# Patient Record
Sex: FEMALE | Race: OTHER
[De-identification: names, ages, dates, MRNs, and addresses within clinical notes are randomized per-mention and may not be internally consistent; named-entity substitution may affect disease eponyms.]

---

## 2017-01-09 ENCOUNTER — ASOB RESULT (OUTPATIENT)
Age: 22
End: 2017-01-09

## 2017-01-09 ENCOUNTER — APPOINTMENT (OUTPATIENT)
Dept: ANTEPARTUM | Facility: CLINIC | Age: 22
End: 2017-01-09

## 2017-01-17 ENCOUNTER — OUTPATIENT (OUTPATIENT)
Dept: OUTPATIENT SERVICES | Facility: HOSPITAL | Age: 22
LOS: 1 days | End: 2017-01-17
Payer: MEDICAID

## 2017-01-17 ENCOUNTER — APPOINTMENT (OUTPATIENT)
Dept: OBGYN | Facility: CLINIC | Age: 22
End: 2017-01-17

## 2017-01-17 VITALS
SYSTOLIC BLOOD PRESSURE: 93 MMHG | HEIGHT: 62 IN | BODY MASS INDEX: 27.42 KG/M2 | WEIGHT: 149 LBS | DIASTOLIC BLOOD PRESSURE: 63 MMHG

## 2017-01-17 DIAGNOSIS — Z34.00 ENCOUNTER FOR SUPERVISION OF NORMAL FIRST PREGNANCY, UNSPECIFIED TRIMESTER: ICD-10-CM

## 2017-01-24 DIAGNOSIS — Z34.82 ENCOUNTER FOR SUPERVISION OF OTHER NORMAL PREGNANCY, SECOND TRIMESTER: ICD-10-CM

## 2017-01-24 DIAGNOSIS — O34.219 MATERNAL CARE FOR UNSPECIFIED TYPE SCAR FROM PREVIOUS CESAREAN DELIVERY: ICD-10-CM

## 2017-02-14 ENCOUNTER — APPOINTMENT (OUTPATIENT)
Dept: OBGYN | Facility: CLINIC | Age: 22
End: 2017-02-14

## 2017-02-14 ENCOUNTER — OUTPATIENT (OUTPATIENT)
Dept: OUTPATIENT SERVICES | Facility: HOSPITAL | Age: 22
LOS: 1 days | End: 2017-02-14
Payer: MEDICAID

## 2017-02-14 VITALS
BODY MASS INDEX: 28.34 KG/M2 | HEIGHT: 62 IN | SYSTOLIC BLOOD PRESSURE: 100 MMHG | WEIGHT: 154 LBS | DIASTOLIC BLOOD PRESSURE: 59 MMHG

## 2017-02-14 DIAGNOSIS — Z34.00 ENCOUNTER FOR SUPERVISION OF NORMAL FIRST PREGNANCY, UNSPECIFIED TRIMESTER: ICD-10-CM

## 2017-02-14 LAB
BASOPHILS # BLD AUTO: 0.03 K/UL — SIGNIFICANT CHANGE UP (ref 0–0.2)
BASOPHILS NFR BLD AUTO: 0.3 % — SIGNIFICANT CHANGE UP (ref 0–2)
EOSINOPHIL # BLD AUTO: 0.63 K/UL — HIGH (ref 0–0.5)
EOSINOPHIL NFR BLD AUTO: 7 % — HIGH (ref 0–6)
HCT VFR BLD CALC: 37.4 % — SIGNIFICANT CHANGE UP (ref 34.5–45)
HGB BLD-MCNC: 12.5 G/DL — SIGNIFICANT CHANGE UP (ref 11.5–15.5)
IMM GRANULOCYTES NFR BLD AUTO: 1 % — SIGNIFICANT CHANGE UP (ref 0–1.5)
LYMPHOCYTES # BLD AUTO: 2.14 K/UL — SIGNIFICANT CHANGE UP (ref 1–3.3)
LYMPHOCYTES # BLD AUTO: 23.9 % — SIGNIFICANT CHANGE UP (ref 13–44)
MCHC RBC-ENTMCNC: 30.8 PG — SIGNIFICANT CHANGE UP (ref 27–34)
MCHC RBC-ENTMCNC: 33.4 GM/DL — SIGNIFICANT CHANGE UP (ref 32–36)
MCV RBC AUTO: 92.1 FL — SIGNIFICANT CHANGE UP (ref 80–100)
MONOCYTES # BLD AUTO: 0.48 K/UL — SIGNIFICANT CHANGE UP (ref 0–0.9)
MONOCYTES NFR BLD AUTO: 5.4 % — SIGNIFICANT CHANGE UP (ref 2–14)
NEUTROPHILS # BLD AUTO: 5.57 K/UL — SIGNIFICANT CHANGE UP (ref 1.8–7.4)
NEUTROPHILS NFR BLD AUTO: 62.4 % — SIGNIFICANT CHANGE UP (ref 43–77)
PLATELET # BLD AUTO: 243 K/UL — SIGNIFICANT CHANGE UP (ref 150–400)
RBC # BLD: 4.06 M/UL — SIGNIFICANT CHANGE UP (ref 3.8–5.2)
RBC # FLD: 15.3 % — HIGH (ref 10.3–14.5)
WBC # BLD: 8.94 K/UL — SIGNIFICANT CHANGE UP (ref 3.8–10.5)
WBC # FLD AUTO: 8.94 K/UL — SIGNIFICANT CHANGE UP (ref 3.8–10.5)

## 2017-02-15 LAB — GLUCOSE 1H P GLC SERPL-MCNC: 111 MG/DL — SIGNIFICANT CHANGE UP (ref 70–134)

## 2017-02-16 DIAGNOSIS — Z34.82 ENCOUNTER FOR SUPERVISION OF OTHER NORMAL PREGNANCY, SECOND TRIMESTER: ICD-10-CM

## 2017-02-16 DIAGNOSIS — O34.219 MATERNAL CARE FOR UNSPECIFIED TYPE SCAR FROM PREVIOUS CESAREAN DELIVERY: ICD-10-CM

## 2017-02-16 DIAGNOSIS — Z3A.25 25 WEEKS GESTATION OF PREGNANCY: ICD-10-CM

## 2017-02-28 ENCOUNTER — OUTPATIENT (OUTPATIENT)
Dept: OUTPATIENT SERVICES | Facility: HOSPITAL | Age: 22
LOS: 1 days | End: 2017-02-28
Payer: MEDICAID

## 2017-02-28 ENCOUNTER — APPOINTMENT (OUTPATIENT)
Dept: OBGYN | Facility: CLINIC | Age: 22
End: 2017-02-28

## 2017-02-28 VITALS
WEIGHT: 156 LBS | SYSTOLIC BLOOD PRESSURE: 102 MMHG | HEIGHT: 62 IN | DIASTOLIC BLOOD PRESSURE: 64 MMHG | BODY MASS INDEX: 28.71 KG/M2

## 2017-02-28 DIAGNOSIS — Z87.898 PERSONAL HISTORY OF OTHER SPECIFIED CONDITIONS: ICD-10-CM

## 2017-02-28 DIAGNOSIS — Z00.00 ENCOUNTER FOR GENERAL ADULT MEDICAL EXAMINATION WITHOUT ABNORMAL FINDINGS: ICD-10-CM

## 2017-02-28 DIAGNOSIS — Z3A.25 25 WEEKS GESTATION OF PREGNANCY: ICD-10-CM

## 2017-03-01 DIAGNOSIS — Z23 ENCOUNTER FOR IMMUNIZATION: ICD-10-CM

## 2017-03-01 DIAGNOSIS — Z34.82 ENCOUNTER FOR SUPERVISION OF OTHER NORMAL PREGNANCY, SECOND TRIMESTER: ICD-10-CM

## 2017-03-01 DIAGNOSIS — O34.219 MATERNAL CARE FOR UNSPECIFIED TYPE SCAR FROM PREVIOUS CESAREAN DELIVERY: ICD-10-CM

## 2017-03-21 ENCOUNTER — APPOINTMENT (OUTPATIENT)
Dept: OBGYN | Facility: CLINIC | Age: 22
End: 2017-03-21

## 2017-03-21 ENCOUNTER — OUTPATIENT (OUTPATIENT)
Dept: OUTPATIENT SERVICES | Facility: HOSPITAL | Age: 22
LOS: 1 days | End: 2017-03-21
Payer: MEDICAID

## 2017-03-21 DIAGNOSIS — Z34.00 ENCOUNTER FOR SUPERVISION OF NORMAL FIRST PREGNANCY, UNSPECIFIED TRIMESTER: ICD-10-CM

## 2017-03-21 RX ORDER — MULTIVIT-MIN/FOLIC/VIT K/LYCOP 400-300MCG
28-0.8 TABLET ORAL DAILY
Qty: 30 | Refills: 6 | Status: DISCONTINUED | COMMUNITY
Start: 2017-02-28 | End: 2017-03-21

## 2017-03-22 DIAGNOSIS — Z3A.20 20 WEEKS GESTATION OF PREGNANCY: ICD-10-CM

## 2017-03-22 DIAGNOSIS — O34.219 MATERNAL CARE FOR UNSPECIFIED TYPE SCAR FROM PREVIOUS CESAREAN DELIVERY: ICD-10-CM

## 2017-03-27 ENCOUNTER — MEDICATION RENEWAL (OUTPATIENT)
Age: 22
End: 2017-03-27

## 2017-03-27 RX ORDER — ELASTIC BANDAGE 2"X2.2YD
0.8 BANDAGE TOPICAL DAILY
Qty: 30 | Refills: 5 | Status: DISCONTINUED | COMMUNITY
Start: 2017-02-14 | End: 2017-03-27

## 2017-04-04 ENCOUNTER — APPOINTMENT (OUTPATIENT)
Dept: OBGYN | Facility: CLINIC | Age: 22
End: 2017-04-04

## 2017-04-04 ENCOUNTER — OUTPATIENT (OUTPATIENT)
Dept: OUTPATIENT SERVICES | Facility: HOSPITAL | Age: 22
LOS: 1 days | End: 2017-04-04
Payer: MEDICAID

## 2017-04-04 VITALS
SYSTOLIC BLOOD PRESSURE: 97 MMHG | WEIGHT: 160 LBS | DIASTOLIC BLOOD PRESSURE: 63 MMHG | BODY MASS INDEX: 29.44 KG/M2 | HEIGHT: 62 IN

## 2017-04-04 DIAGNOSIS — Z23 ENCOUNTER FOR IMMUNIZATION: ICD-10-CM

## 2017-04-04 DIAGNOSIS — Z34.82 ENCOUNTER FOR SUPERVISION OF OTHER NORMAL PREGNANCY, SECOND TRIMESTER: ICD-10-CM

## 2017-04-04 DIAGNOSIS — Z3A.30 30 WEEKS GESTATION OF PREGNANCY: ICD-10-CM

## 2017-04-04 DIAGNOSIS — Z34.00 ENCOUNTER FOR SUPERVISION OF NORMAL FIRST PREGNANCY, UNSPECIFIED TRIMESTER: ICD-10-CM

## 2017-04-10 DIAGNOSIS — Z34.83 ENCOUNTER FOR SUPERVISION OF OTHER NORMAL PREGNANCY, THIRD TRIMESTER: ICD-10-CM

## 2017-04-10 DIAGNOSIS — O34.219 MATERNAL CARE FOR UNSPECIFIED TYPE SCAR FROM PREVIOUS CESAREAN DELIVERY: ICD-10-CM

## 2017-04-19 ENCOUNTER — APPOINTMENT (OUTPATIENT)
Dept: OBGYN | Facility: CLINIC | Age: 22
End: 2017-04-19

## 2017-04-19 ENCOUNTER — OUTPATIENT (OUTPATIENT)
Dept: OUTPATIENT SERVICES | Facility: HOSPITAL | Age: 22
LOS: 1 days | End: 2017-04-19
Payer: MEDICAID

## 2017-04-19 VITALS
WEIGHT: 163 LBS | SYSTOLIC BLOOD PRESSURE: 103 MMHG | BODY MASS INDEX: 30 KG/M2 | HEIGHT: 62 IN | DIASTOLIC BLOOD PRESSURE: 60 MMHG

## 2017-04-19 DIAGNOSIS — Z34.00 ENCOUNTER FOR SUPERVISION OF NORMAL FIRST PREGNANCY, UNSPECIFIED TRIMESTER: ICD-10-CM

## 2017-04-19 LAB
BASOPHILS # BLD AUTO: 0.04 K/UL — SIGNIFICANT CHANGE UP (ref 0–0.2)
BASOPHILS NFR BLD AUTO: 0.4 % — SIGNIFICANT CHANGE UP (ref 0–2)
EOSINOPHIL # BLD AUTO: 0.5 K/UL — SIGNIFICANT CHANGE UP (ref 0–0.5)
EOSINOPHIL NFR BLD AUTO: 5.3 % — SIGNIFICANT CHANGE UP (ref 0–6)
HCT VFR BLD CALC: 38.8 % — SIGNIFICANT CHANGE UP (ref 34.5–45)
HGB BLD-MCNC: 13.6 G/DL — SIGNIFICANT CHANGE UP (ref 11.5–15.5)
HIV 1+2 AB+HIV1 P24 AG SERPL QL IA: SIGNIFICANT CHANGE UP
IMM GRANULOCYTES NFR BLD AUTO: 1.6 % — HIGH (ref 0–1.5)
LYMPHOCYTES # BLD AUTO: 2.24 K/UL — SIGNIFICANT CHANGE UP (ref 1–3.3)
LYMPHOCYTES # BLD AUTO: 23.6 % — SIGNIFICANT CHANGE UP (ref 13–44)
MCHC RBC-ENTMCNC: 32.5 PG — SIGNIFICANT CHANGE UP (ref 27–34)
MCHC RBC-ENTMCNC: 35.1 GM/DL — SIGNIFICANT CHANGE UP (ref 32–36)
MCV RBC AUTO: 92.6 FL — SIGNIFICANT CHANGE UP (ref 80–100)
MONOCYTES # BLD AUTO: 0.6 K/UL — SIGNIFICANT CHANGE UP (ref 0–0.9)
MONOCYTES NFR BLD AUTO: 6.3 % — SIGNIFICANT CHANGE UP (ref 2–14)
NEUTROPHILS # BLD AUTO: 5.98 K/UL — SIGNIFICANT CHANGE UP (ref 1.8–7.4)
NEUTROPHILS NFR BLD AUTO: 62.8 % — SIGNIFICANT CHANGE UP (ref 43–77)
PLATELET # BLD AUTO: 219 K/UL — SIGNIFICANT CHANGE UP (ref 150–400)
RBC # BLD: 4.19 M/UL — SIGNIFICANT CHANGE UP (ref 3.8–5.2)
RBC # FLD: 13.2 % — SIGNIFICANT CHANGE UP (ref 10.3–14.5)
T PALLIDUM AB TITR SER: NEGATIVE — SIGNIFICANT CHANGE UP
WBC # BLD: 9.51 K/UL — SIGNIFICANT CHANGE UP (ref 3.8–10.5)
WBC # FLD AUTO: 9.51 K/UL — SIGNIFICANT CHANGE UP (ref 3.8–10.5)

## 2017-04-20 ENCOUNTER — APPOINTMENT (OUTPATIENT)
Dept: ANTEPARTUM | Facility: CLINIC | Age: 22
End: 2017-04-20

## 2017-04-20 ENCOUNTER — ASOB RESULT (OUTPATIENT)
Age: 22
End: 2017-04-20

## 2017-04-20 LAB
C TRACH RRNA SPEC QL NAA+PROBE: SIGNIFICANT CHANGE UP
GROUP B BETA STREP DNA (PCR): SIGNIFICANT CHANGE UP
GROUP B BETA STREP INTERPRETATION: SIGNIFICANT CHANGE UP
N GONORRHOEA RRNA SPEC QL NAA+PROBE: SIGNIFICANT CHANGE UP
SOURCE GROUP B STREP: SIGNIFICANT CHANGE UP
SPECIMEN SOURCE: SIGNIFICANT CHANGE UP

## 2017-04-21 DIAGNOSIS — Z34.83 ENCOUNTER FOR SUPERVISION OF OTHER NORMAL PREGNANCY, THIRD TRIMESTER: ICD-10-CM

## 2017-04-21 DIAGNOSIS — O34.219 MATERNAL CARE FOR UNSPECIFIED TYPE SCAR FROM PREVIOUS CESAREAN DELIVERY: ICD-10-CM

## 2017-04-27 ENCOUNTER — MEDICATION RENEWAL (OUTPATIENT)
Age: 22
End: 2017-04-27

## 2017-04-27 RX ORDER — ELASTIC BANDAGE 2"X2.2YD
0.8 BANDAGE TOPICAL DAILY
Qty: 30 | Refills: 5 | Status: DISCONTINUED | COMMUNITY
Start: 2017-03-27 | End: 2017-04-27

## 2017-04-27 RX ORDER — ELASTIC BANDAGE 2"X2.2YD
0.8 BANDAGE TOPICAL DAILY
Qty: 30 | Refills: 8 | Status: ACTIVE | COMMUNITY
Start: 2017-04-27 | End: 1900-01-01

## 2017-05-03 ENCOUNTER — OUTPATIENT (OUTPATIENT)
Dept: OUTPATIENT SERVICES | Facility: HOSPITAL | Age: 22
LOS: 1 days | End: 2017-05-03
Payer: MEDICAID

## 2017-05-03 ENCOUNTER — APPOINTMENT (OUTPATIENT)
Dept: OBGYN | Facility: CLINIC | Age: 22
End: 2017-05-03

## 2017-05-03 VITALS
WEIGHT: 167 LBS | HEIGHT: 62 IN | DIASTOLIC BLOOD PRESSURE: 67 MMHG | BODY MASS INDEX: 30.73 KG/M2 | SYSTOLIC BLOOD PRESSURE: 111 MMHG

## 2017-05-03 DIAGNOSIS — Z34.00 ENCOUNTER FOR SUPERVISION OF NORMAL FIRST PREGNANCY, UNSPECIFIED TRIMESTER: ICD-10-CM

## 2017-05-04 DIAGNOSIS — O34.219 MATERNAL CARE FOR UNSPECIFIED TYPE SCAR FROM PREVIOUS CESAREAN DELIVERY: ICD-10-CM

## 2017-05-04 DIAGNOSIS — Z34.83 ENCOUNTER FOR SUPERVISION OF OTHER NORMAL PREGNANCY, THIRD TRIMESTER: ICD-10-CM

## 2017-05-10 ENCOUNTER — APPOINTMENT (OUTPATIENT)
Dept: OBGYN | Facility: CLINIC | Age: 22
End: 2017-05-10

## 2017-05-10 ENCOUNTER — OUTPATIENT (OUTPATIENT)
Dept: OUTPATIENT SERVICES | Facility: HOSPITAL | Age: 22
LOS: 1 days | End: 2017-05-10
Payer: MEDICAID

## 2017-05-10 VITALS
WEIGHT: 168 LBS | DIASTOLIC BLOOD PRESSURE: 80 MMHG | HEIGHT: 62 IN | SYSTOLIC BLOOD PRESSURE: 122 MMHG | BODY MASS INDEX: 30.91 KG/M2

## 2017-05-10 DIAGNOSIS — Z34.83 ENCOUNTER FOR SUPERVISION OF OTHER NORMAL PREGNANCY, THIRD TRIMESTER: ICD-10-CM

## 2017-05-10 DIAGNOSIS — O34.219 MATERNAL CARE FOR UNSPECIFIED TYPE SCAR FROM PREVIOUS CESAREAN DELIVERY: ICD-10-CM

## 2017-05-10 DIAGNOSIS — Z34.00 ENCOUNTER FOR SUPERVISION OF NORMAL FIRST PREGNANCY, UNSPECIFIED TRIMESTER: ICD-10-CM

## 2017-05-11 DIAGNOSIS — O34.219 MATERNAL CARE FOR UNSPECIFIED TYPE SCAR FROM PREVIOUS CESAREAN DELIVERY: ICD-10-CM

## 2017-05-11 DIAGNOSIS — Z34.83 ENCOUNTER FOR SUPERVISION OF OTHER NORMAL PREGNANCY, THIRD TRIMESTER: ICD-10-CM

## 2017-05-17 ENCOUNTER — OUTPATIENT (OUTPATIENT)
Dept: OUTPATIENT SERVICES | Facility: HOSPITAL | Age: 22
LOS: 1 days | End: 2017-05-17
Payer: MEDICAID

## 2017-05-17 DIAGNOSIS — Z3A.39 39 WEEKS GESTATION OF PREGNANCY: ICD-10-CM

## 2017-05-17 DIAGNOSIS — Z01.818 ENCOUNTER FOR OTHER PREPROCEDURAL EXAMINATION: ICD-10-CM

## 2017-05-17 LAB
ANION GAP SERPL CALC-SCNC: 9 MMOL/L — SIGNIFICANT CHANGE UP (ref 5–17)
APTT BLD: 25.5 SEC — LOW (ref 27.5–37.4)
BUN SERPL-MCNC: 5 MG/DL — LOW (ref 7–18)
CALCIUM SERPL-MCNC: 8.9 MG/DL — SIGNIFICANT CHANGE UP (ref 8.4–10.5)
CHLORIDE SERPL-SCNC: 107 MMOL/L — SIGNIFICANT CHANGE UP (ref 96–108)
CO2 SERPL-SCNC: 23 MMOL/L — SIGNIFICANT CHANGE UP (ref 22–31)
CREAT SERPL-MCNC: 0.46 MG/DL — LOW (ref 0.5–1.3)
GLUCOSE SERPL-MCNC: 93 MG/DL — SIGNIFICANT CHANGE UP (ref 70–99)
HCT VFR BLD CALC: 43.1 % — SIGNIFICANT CHANGE UP (ref 34.5–45)
HGB BLD-MCNC: 14.4 G/DL — SIGNIFICANT CHANGE UP (ref 11.5–15.5)
INR BLD: 0.94 RATIO — SIGNIFICANT CHANGE UP (ref 0.88–1.16)
MCHC RBC-ENTMCNC: 31.9 PG — SIGNIFICANT CHANGE UP (ref 27–34)
MCHC RBC-ENTMCNC: 33.5 GM/DL — SIGNIFICANT CHANGE UP (ref 32–36)
MCV RBC AUTO: 95.3 FL — SIGNIFICANT CHANGE UP (ref 80–100)
PLATELET # BLD AUTO: 258 K/UL — SIGNIFICANT CHANGE UP (ref 150–400)
POTASSIUM SERPL-MCNC: 3.8 MMOL/L — SIGNIFICANT CHANGE UP (ref 3.5–5.3)
POTASSIUM SERPL-SCNC: 3.8 MMOL/L — SIGNIFICANT CHANGE UP (ref 3.5–5.3)
PROTHROM AB SERPL-ACNC: 10.2 SEC — SIGNIFICANT CHANGE UP (ref 9.8–12.7)
RBC # BLD: 4.52 M/UL — SIGNIFICANT CHANGE UP (ref 3.8–5.2)
RBC # FLD: 12 % — SIGNIFICANT CHANGE UP (ref 10.3–14.5)
SODIUM SERPL-SCNC: 139 MMOL/L — SIGNIFICANT CHANGE UP (ref 135–145)
T PALLIDUM AB TITR SER: NEGATIVE — SIGNIFICANT CHANGE UP
WBC # BLD: 10.3 K/UL — SIGNIFICANT CHANGE UP (ref 3.8–10.5)
WBC # FLD AUTO: 10.3 K/UL — SIGNIFICANT CHANGE UP (ref 3.8–10.5)

## 2017-05-18 ENCOUNTER — TRANSCRIPTION ENCOUNTER (OUTPATIENT)
Age: 22
End: 2017-05-18

## 2017-05-18 ENCOUNTER — RESULT REVIEW (OUTPATIENT)
Age: 22
End: 2017-05-18

## 2017-05-18 ENCOUNTER — INPATIENT (INPATIENT)
Facility: HOSPITAL | Age: 22
LOS: 2 days | Discharge: HOME | End: 2017-05-21
Attending: OBSTETRICS & GYNECOLOGY | Admitting: OBSTETRICS & GYNECOLOGY
Payer: MEDICAID

## 2017-05-18 VITALS — HEIGHT: 62 IN | WEIGHT: 169.76 LBS

## 2017-05-18 DIAGNOSIS — Z3A.39 39 WEEKS GESTATION OF PREGNANCY: ICD-10-CM

## 2017-05-18 LAB
BASOPHILS # BLD AUTO: 0.1 K/UL — SIGNIFICANT CHANGE UP (ref 0–0.2)
BASOPHILS NFR BLD AUTO: 0.8 % — SIGNIFICANT CHANGE UP (ref 0–2)
EOSINOPHIL # BLD AUTO: 0.2 K/UL — SIGNIFICANT CHANGE UP (ref 0–0.5)
EOSINOPHIL NFR BLD AUTO: 1.2 % — SIGNIFICANT CHANGE UP (ref 0–6)
HCT VFR BLD CALC: 31.1 % — LOW (ref 34.5–45)
HCT VFR BLD CALC: 38 % — SIGNIFICANT CHANGE UP (ref 34.5–45)
HGB BLD-MCNC: 11.2 G/DL — LOW (ref 11.5–15.5)
HGB BLD-MCNC: 12.6 G/DL — SIGNIFICANT CHANGE UP (ref 11.5–15.5)
LYMPHOCYTES # BLD AUTO: 15.1 % — SIGNIFICANT CHANGE UP (ref 13–44)
LYMPHOCYTES # BLD AUTO: 2.6 K/UL — SIGNIFICANT CHANGE UP (ref 1–3.3)
MCHC RBC-ENTMCNC: 31.4 PG — SIGNIFICANT CHANGE UP (ref 27–34)
MCHC RBC-ENTMCNC: 33.3 GM/DL — SIGNIFICANT CHANGE UP (ref 32–36)
MCHC RBC-ENTMCNC: 33.6 PG — SIGNIFICANT CHANGE UP (ref 27–34)
MCHC RBC-ENTMCNC: 35.9 GM/DL — SIGNIFICANT CHANGE UP (ref 32–36)
MCV RBC AUTO: 93.4 FL — SIGNIFICANT CHANGE UP (ref 80–100)
MCV RBC AUTO: 94.5 FL — SIGNIFICANT CHANGE UP (ref 80–100)
MONOCYTES # BLD AUTO: 0.7 K/UL — SIGNIFICANT CHANGE UP (ref 0–0.9)
MONOCYTES NFR BLD AUTO: 4.3 % — SIGNIFICANT CHANGE UP (ref 2–14)
NEUTROPHILS # BLD AUTO: 13.6 K/UL — HIGH (ref 1.8–7.4)
NEUTROPHILS NFR BLD AUTO: 78.6 % — HIGH (ref 43–77)
PLATELET # BLD AUTO: 218 K/UL — SIGNIFICANT CHANGE UP (ref 150–400)
PLATELET # BLD AUTO: 253 K/UL — SIGNIFICANT CHANGE UP (ref 150–400)
RBC # BLD: 3.33 M/UL — LOW (ref 3.8–5.2)
RBC # BLD: 4.02 M/UL — SIGNIFICANT CHANGE UP (ref 3.8–5.2)
RBC # FLD: 11.7 % — SIGNIFICANT CHANGE UP (ref 10.3–14.5)
RBC # FLD: 12.4 % — SIGNIFICANT CHANGE UP (ref 10.3–14.5)
WBC # BLD: 13.3 K/UL — HIGH (ref 3.8–10.5)
WBC # BLD: 17.4 K/UL — HIGH (ref 3.8–10.5)
WBC # FLD AUTO: 13.3 K/UL — HIGH (ref 3.8–10.5)
WBC # FLD AUTO: 17.4 K/UL — HIGH (ref 3.8–10.5)

## 2017-05-18 PROCEDURE — 59514 CESAREAN DELIVERY ONLY: CPT | Mod: U9

## 2017-05-18 PROCEDURE — 85730 THROMBOPLASTIN TIME PARTIAL: CPT

## 2017-05-18 PROCEDURE — 86920 COMPATIBILITY TEST SPIN: CPT

## 2017-05-18 PROCEDURE — 86900 BLOOD TYPING SEROLOGIC ABO: CPT

## 2017-05-18 PROCEDURE — 86780 TREPONEMA PALLIDUM: CPT

## 2017-05-18 PROCEDURE — 86901 BLOOD TYPING SEROLOGIC RH(D): CPT

## 2017-05-18 PROCEDURE — 85610 PROTHROMBIN TIME: CPT

## 2017-05-18 PROCEDURE — 85027 COMPLETE CBC AUTOMATED: CPT

## 2017-05-18 PROCEDURE — 88302 TISSUE EXAM BY PATHOLOGIST: CPT | Mod: 26

## 2017-05-18 PROCEDURE — 80048 BASIC METABOLIC PNL TOTAL CA: CPT

## 2017-05-18 PROCEDURE — 86850 RBC ANTIBODY SCREEN: CPT

## 2017-05-18 RX ORDER — SODIUM CHLORIDE 9 MG/ML
1000 INJECTION, SOLUTION INTRAVENOUS ONCE
Qty: 0 | Refills: 0 | Status: COMPLETED | OUTPATIENT
Start: 2017-05-18 | End: 2017-05-18

## 2017-05-18 RX ORDER — SODIUM CHLORIDE 9 MG/ML
1000 INJECTION, SOLUTION INTRAVENOUS
Qty: 0 | Refills: 0 | Status: DISCONTINUED | OUTPATIENT
Start: 2017-05-18 | End: 2017-05-20

## 2017-05-18 RX ORDER — SIMETHICONE 80 MG/1
80 TABLET, CHEWABLE ORAL EVERY 4 HOURS
Qty: 0 | Refills: 0 | Status: DISCONTINUED | OUTPATIENT
Start: 2017-05-18 | End: 2017-05-21

## 2017-05-18 RX ORDER — MORPHINE SULFATE 50 MG/1
4 CAPSULE, EXTENDED RELEASE ORAL EVERY 4 HOURS
Qty: 0 | Refills: 0 | Status: DISCONTINUED | OUTPATIENT
Start: 2017-05-18 | End: 2017-05-19

## 2017-05-18 RX ORDER — KETOROLAC TROMETHAMINE 30 MG/ML
30 SYRINGE (ML) INJECTION EVERY 6 HOURS
Qty: 0 | Refills: 0 | Status: DISCONTINUED | OUTPATIENT
Start: 2017-05-18 | End: 2017-05-19

## 2017-05-18 RX ORDER — FERROUS SULFATE 325(65) MG
325 TABLET ORAL DAILY
Qty: 0 | Refills: 0 | Status: DISCONTINUED | OUTPATIENT
Start: 2017-05-18 | End: 2017-05-21

## 2017-05-18 RX ORDER — TETANUS TOXOID, REDUCED DIPHTHERIA TOXOID AND ACELLULAR PERTUSSIS VACCINE, ADSORBED 5; 2.5; 8; 8; 2.5 [IU]/.5ML; [IU]/.5ML; UG/.5ML; UG/.5ML; UG/.5ML
0.5 SUSPENSION INTRAMUSCULAR ONCE
Qty: 0 | Refills: 0 | Status: DISCONTINUED | OUTPATIENT
Start: 2017-05-18 | End: 2017-05-21

## 2017-05-18 RX ORDER — CITRIC ACID/SODIUM CITRATE 300-500 MG
30 SOLUTION, ORAL ORAL ONCE
Qty: 0 | Refills: 0 | Status: DISCONTINUED | OUTPATIENT
Start: 2017-05-18 | End: 2017-05-21

## 2017-05-18 RX ORDER — ACETAMINOPHEN 500 MG
650 TABLET ORAL EVERY 6 HOURS
Qty: 0 | Refills: 0 | Status: DISCONTINUED | OUTPATIENT
Start: 2017-05-18 | End: 2017-05-21

## 2017-05-18 RX ORDER — DIPHENHYDRAMINE HCL 50 MG
25 CAPSULE ORAL EVERY 6 HOURS
Qty: 0 | Refills: 0 | Status: DISCONTINUED | OUTPATIENT
Start: 2017-05-18 | End: 2017-05-21

## 2017-05-18 RX ORDER — GLYCERIN ADULT
1 SUPPOSITORY, RECTAL RECTAL AT BEDTIME
Qty: 0 | Refills: 0 | Status: DISCONTINUED | OUTPATIENT
Start: 2017-05-18 | End: 2017-05-21

## 2017-05-18 RX ORDER — CEFAZOLIN SODIUM 1 G
2000 VIAL (EA) INJECTION ONCE
Qty: 0 | Refills: 0 | Status: COMPLETED | OUTPATIENT
Start: 2017-05-18 | End: 2017-05-18

## 2017-05-18 RX ORDER — DOCUSATE SODIUM 100 MG
100 CAPSULE ORAL
Qty: 0 | Refills: 0 | Status: DISCONTINUED | OUTPATIENT
Start: 2017-05-18 | End: 2017-05-20

## 2017-05-18 RX ORDER — ENOXAPARIN SODIUM 100 MG/ML
40 INJECTION SUBCUTANEOUS DAILY
Qty: 0 | Refills: 0 | Status: DISCONTINUED | OUTPATIENT
Start: 2017-05-18 | End: 2017-05-21

## 2017-05-18 RX ORDER — LANOLIN
1 OINTMENT (GRAM) TOPICAL
Qty: 0 | Refills: 0 | Status: DISCONTINUED | OUTPATIENT
Start: 2017-05-18 | End: 2017-05-21

## 2017-05-18 RX ORDER — OXYTOCIN 10 UNIT/ML
41.67 VIAL (ML) INJECTION
Qty: 20 | Refills: 0 | Status: DISCONTINUED | OUTPATIENT
Start: 2017-05-18 | End: 2017-05-21

## 2017-05-18 RX ORDER — METOCLOPRAMIDE HCL 10 MG
10 TABLET ORAL ONCE
Qty: 0 | Refills: 0 | Status: DISCONTINUED | OUTPATIENT
Start: 2017-05-18 | End: 2017-05-21

## 2017-05-18 RX ADMIN — Medication 125 MILLIUNIT(S)/MIN: at 09:09

## 2017-05-18 RX ADMIN — SODIUM CHLORIDE 2000 MILLILITER(S): 9 INJECTION, SOLUTION INTRAVENOUS at 06:40

## 2017-05-18 RX ADMIN — MORPHINE SULFATE 4 MILLIGRAM(S): 50 CAPSULE, EXTENDED RELEASE ORAL at 14:23

## 2017-05-18 RX ADMIN — Medication 30 MILLIGRAM(S): at 11:11

## 2017-05-18 RX ADMIN — Medication 100 MILLIGRAM(S): at 07:44

## 2017-05-18 RX ADMIN — Medication 30 MILLIGRAM(S): at 18:24

## 2017-05-18 RX ADMIN — MORPHINE SULFATE 4 MILLIGRAM(S): 50 CAPSULE, EXTENDED RELEASE ORAL at 13:53

## 2017-05-18 RX ADMIN — Medication 30 MILLIGRAM(S): at 11:41

## 2017-05-18 RX ADMIN — SODIUM CHLORIDE 125 MILLILITER(S): 9 INJECTION, SOLUTION INTRAVENOUS at 07:15

## 2017-05-18 RX ADMIN — Medication 30 MILLIGRAM(S): at 17:48

## 2017-05-18 RX ADMIN — Medication 30 MILLIGRAM(S): at 23:23

## 2017-05-19 ENCOUNTER — TRANSCRIPTION ENCOUNTER (OUTPATIENT)
Age: 22
End: 2017-05-19

## 2017-05-19 LAB
HCT VFR BLD CALC: 30.2 % — LOW (ref 34.5–45)
HGB BLD-MCNC: 10.3 G/DL — LOW (ref 11.5–15.5)
MCHC RBC-ENTMCNC: 32.4 PG — SIGNIFICANT CHANGE UP (ref 27–34)
MCHC RBC-ENTMCNC: 34 GM/DL — SIGNIFICANT CHANGE UP (ref 32–36)
MCV RBC AUTO: 95.2 FL — SIGNIFICANT CHANGE UP (ref 80–100)
PLATELET # BLD AUTO: 237 K/UL — SIGNIFICANT CHANGE UP (ref 150–400)
RBC # BLD: 3.18 M/UL — LOW (ref 3.8–5.2)
RBC # FLD: 11.9 % — SIGNIFICANT CHANGE UP (ref 10.3–14.5)
WBC # BLD: 14.9 K/UL — HIGH (ref 3.8–10.5)
WBC # FLD AUTO: 14.9 K/UL — HIGH (ref 3.8–10.5)

## 2017-05-19 RX ORDER — IBUPROFEN 200 MG
600 TABLET ORAL EVERY 6 HOURS
Qty: 0 | Refills: 0 | Status: DISCONTINUED | OUTPATIENT
Start: 2017-05-19 | End: 2017-05-21

## 2017-05-19 RX ADMIN — Medication 600 MILLIGRAM(S): at 13:45

## 2017-05-19 RX ADMIN — Medication 30 MILLIGRAM(S): at 05:49

## 2017-05-19 RX ADMIN — Medication 600 MILLIGRAM(S): at 13:09

## 2017-05-19 RX ADMIN — ENOXAPARIN SODIUM 40 MILLIGRAM(S): 100 INJECTION SUBCUTANEOUS at 01:49

## 2017-05-19 RX ADMIN — SIMETHICONE 80 MILLIGRAM(S): 80 TABLET, CHEWABLE ORAL at 18:24

## 2017-05-19 RX ADMIN — Medication 600 MILLIGRAM(S): at 22:09

## 2017-05-19 RX ADMIN — Medication 100 MILLIGRAM(S): at 13:09

## 2017-05-19 RX ADMIN — SIMETHICONE 80 MILLIGRAM(S): 80 TABLET, CHEWABLE ORAL at 13:09

## 2017-05-19 RX ADMIN — Medication 325 MILLIGRAM(S): at 13:09

## 2017-05-19 RX ADMIN — Medication 1 TABLET(S): at 13:09

## 2017-05-19 RX ADMIN — Medication 600 MILLIGRAM(S): at 21:39

## 2017-05-19 RX ADMIN — ENOXAPARIN SODIUM 40 MILLIGRAM(S): 100 INJECTION SUBCUTANEOUS at 13:09

## 2017-05-19 RX ADMIN — Medication 30 MILLIGRAM(S): at 06:30

## 2017-05-19 NOTE — DISCHARGE NOTE OB - CARE PLAN
Principal Discharge DX:	 delivery delivered  Goal:	pain management and breastfeeding  Instructions for follow-up, activity and diet:	Continue breastfeeding.  Motrin as needed for pain.  Ambulate daily.  No heavy lifting or anything per vagina x 6 weeks - no sex, tampons, douching, tub baths, etc.  Follow up in office in 2 weeks for incision check, and then at 6 weeks for postpartum check.

## 2017-05-19 NOTE — DISCHARGE NOTE OB - MATERIALS PROVIDED
Birth Certificate Instructions/Discharge Medication Information for Patients and Families Pocket Guide/[facility] Screening Program/  Immunization Record/[facility] Hearing Screen Program/Letter of Medical Neccessity/Vaccinations/Breastfeeding Guide and Packet/Shaken Baby Prevention Handout/Breastfeeding Mother’s Support Group Information/Guide to Postpartum Care/Back To Sleep Handout

## 2017-05-19 NOTE — DISCHARGE NOTE OB - PATIENT PORTAL LINK FT
“You can access the [facility] Patient Portal, offered by [facility], by registering with the following website: [email or web address]”

## 2017-05-19 NOTE — DISCHARGE NOTE OB - PROVIDER TOKENS
FREE:[LAST:[[facility]],PHONE:[phone]],FAX:[phone]    -],ADDRESS:[[address] [place], [place]]]

## 2017-05-19 NOTE — DISCHARGE NOTE OB - ADDITIONAL INSTRUCTIONS
Continue breastfeeding.  Motrin as needed for pain.  Ambulate daily.  No heavy lifting or anything per vagina x 6 weeks - no sex, tampons, douching, tub baths, etc.  Follow up in office in 2 weeks for incision check, and then at 6 weeks for postpartum check.

## 2017-05-19 NOTE — DISCHARGE NOTE OB - HOSPITAL COURSE
Patient arrived for scheduled repeat  section and bilateral tubal ligation, uncomplicated procedure  Routine post operative course, discharged on post op day 3 clinically stable

## 2017-05-19 NOTE — PROGRESS NOTE ADULT - SUBJECTIVE AND OBJECTIVE BOX
Patient seen resting comfortably.  No new complaints.  Denies HA, CP, SOB, N/V/D, dizziness, palpitations, worsening abdominal pain, worsening vaginal bleeding, or any other concerns. + Ambulation, + void without difficulty, + flatus - tolerating regular diet. Pumping for breast milk.    Vital Signs Last 24 Hrs  T(C): 36.7, Max: 36.7 (05-18 @ 10:15)  T(F): 98, Max: 98.1 (05-18 @ 10:15)  HR: 76 (52 - 80)  BP: 94/49 (94/39 - 115/57)  BP(mean): 56 (51 - 65)  RR: 17 (13 - 20)  SpO2: 99% (99% - 100%)    Gen: A&O x 3, NAD  Chest: CTABL  Cardiac: S1+S2+ RRR  Breast: Soft, nontender, nonengorged  Abdomen: Nontender, nondistended, +BS, Incision clean/dry/intact  Gyn: Minimal bleeding  Extremities: Nontender, DTRS 2+, no worsening edema, venodynes intact                          10.3   14.9  )-----------( 237      ( 19 May 2017 06:32 )             30.2       A/P:  Patient is a 21 year old P3 s/p  section and BTL, POD #1.     -Pain management as needed  -Advance as per protocol  -OOB and ambulate  -Repeat CBC   -Encourage breastfeeding

## 2017-05-19 NOTE — DISCHARGE NOTE OB - REASON FOR ADMISSION
Scheduled repeat c/s and bilateral tubal ligation Scheduled repeat c/s and bilateral tubal ligation@39 weeks 1 day

## 2017-05-19 NOTE — DISCHARGE NOTE OB - MEDICATION SUMMARY - MEDICATIONS TO TAKE
I will START or STAY ON the medications listed below when I get home from the hospital:    ibuprofen 600 mg oral tablet  -- 1 tab(s) by mouth every 6 hours, As needed, mild pain  -- Indication: For Pain, as needed    ferrous sulfate 325 mg (65 mg elemental iron) oral tablet  -- 1 tab(s) by mouth 2 times a day  -- Indication: For anemia    Prenatal Multivitamins with Folic Acid 1 mg oral tablet  -- 1 tab(s) by mouth once a day  -- Indication: For multivitamin    docusate sodium 100 mg oral capsule  -- 1 cap(s) by mouth 2 times a day  -- Indication: For stool softener    ascorbic acid 500 mg oral tablet  -- 1 tab(s) by mouth once a day  -- Indication: For wound healing

## 2017-05-19 NOTE — DISCHARGE NOTE OB - PLAN OF CARE
pain management and breastfeeding Continue breastfeeding.  Motrin as needed for pain.  Ambulate daily.  No heavy lifting or anything per vagina x 6 weeks - no sex, tampons, douching, tub baths, etc.  Follow up in office in 2 weeks for incision check, and then at 6 weeks for postpartum check.

## 2017-05-20 LAB
HCT VFR BLD CALC: 29.2 % — LOW (ref 34.5–45)
HGB BLD-MCNC: 10.6 G/DL — LOW (ref 11.5–15.5)
MCHC RBC-ENTMCNC: 33.8 PG — SIGNIFICANT CHANGE UP (ref 27–34)
MCHC RBC-ENTMCNC: 36.4 GM/DL — HIGH (ref 32–36)
MCV RBC AUTO: 92.7 FL — SIGNIFICANT CHANGE UP (ref 80–100)
PLATELET # BLD AUTO: 248 K/UL — SIGNIFICANT CHANGE UP (ref 150–400)
RBC # BLD: 3.15 M/UL — LOW (ref 3.8–5.2)
RBC # FLD: 13.2 % — SIGNIFICANT CHANGE UP (ref 10.3–14.5)
WBC # BLD: 15.3 K/UL — HIGH (ref 3.8–10.5)
WBC # FLD AUTO: 15.3 K/UL — HIGH (ref 3.8–10.5)

## 2017-05-20 RX ORDER — SENNA PLUS 8.6 MG/1
2 TABLET ORAL AT BEDTIME
Qty: 0 | Refills: 0 | Status: DISCONTINUED | OUTPATIENT
Start: 2017-05-20 | End: 2017-05-21

## 2017-05-20 RX ORDER — DOCUSATE SODIUM 100 MG
1 CAPSULE ORAL
Qty: 40 | Refills: 0 | OUTPATIENT
Start: 2017-05-20 | End: 2017-06-09

## 2017-05-20 RX ORDER — FERROUS SULFATE 325(65) MG
1 TABLET ORAL
Qty: 60 | Refills: 0 | OUTPATIENT
Start: 2017-05-20 | End: 2017-06-19

## 2017-05-20 RX ORDER — ASCORBIC ACID 60 MG
1 TABLET,CHEWABLE ORAL
Qty: 30 | Refills: 0 | OUTPATIENT
Start: 2017-05-20 | End: 2017-06-19

## 2017-05-20 RX ORDER — IBUPROFEN 200 MG
1 TABLET ORAL
Qty: 40 | Refills: 0 | OUTPATIENT
Start: 2017-05-20 | End: 2017-05-30

## 2017-05-20 RX ORDER — ASCORBIC ACID 60 MG
500 TABLET,CHEWABLE ORAL DAILY
Qty: 0 | Refills: 0 | Status: DISCONTINUED | OUTPATIENT
Start: 2017-05-20 | End: 2017-05-21

## 2017-05-20 RX ORDER — DOCUSATE SODIUM 100 MG
100 CAPSULE ORAL
Qty: 0 | Refills: 0 | Status: DISCONTINUED | OUTPATIENT
Start: 2017-05-20 | End: 2017-05-21

## 2017-05-20 RX ADMIN — Medication 600 MILLIGRAM(S): at 22:33

## 2017-05-20 RX ADMIN — SENNA PLUS 2 TABLET(S): 8.6 TABLET ORAL at 22:10

## 2017-05-20 RX ADMIN — Medication 600 MILLIGRAM(S): at 16:01

## 2017-05-20 RX ADMIN — Medication 1 TABLET(S): at 10:43

## 2017-05-20 RX ADMIN — Medication 600 MILLIGRAM(S): at 03:22

## 2017-05-20 RX ADMIN — Medication 600 MILLIGRAM(S): at 16:32

## 2017-05-20 RX ADMIN — Medication 500 MILLIGRAM(S): at 10:43

## 2017-05-20 RX ADMIN — Medication 600 MILLIGRAM(S): at 03:52

## 2017-05-20 RX ADMIN — Medication 600 MILLIGRAM(S): at 23:48

## 2017-05-20 NOTE — PROGRESS NOTE ADULT - SUBJECTIVE AND OBJECTIVE BOX
PA NOTE:  POD#2 rpt cs at 39 1/7wk with BTL    Patient seen at bedside resting comfortably offers no new complaints. + Ambulation, + void without difficulty, + flatus; tolerating regular diet. both breastfeeding and bottle feeding. Denies HA, CP, SOB, N/V/D,  no bm; dizziness, palpitations, worsening abdominal pain, worsening vaginal bleeding, or any other concerns.     Vital Signs Last 24 Hrs  T(C): 36.5, Max: 36.6 (05-19 @ 12:11)  T(F): 97.7, Max: 97.9 (05-19 @ 17:56)  HR: 66 (66 - 89)  BP: 100/51 (97/49 - 100/51)  BP(mean): --  RR: 16 (15 - 16)  SpO2: 100% (99% - 100%)  CAPILLARY BLOOD GLUCOSE      Gen: A&O x 3, NAD  Chest: CTABL  Cardiac: S1+S2+ RRR  Breast: Soft, nontender, nonengorged  Abdomen: +BS; soft; Nontender, nondistended, dressing removed Incision C/D/I staples in place   Gyn: Minimal lochia  Extremities: Nontender, DTRS 2+, no worsening edema    CBC Full  -  ( 20 May 2017 07:07 )  WBC Count : 15.3 K/uL  Hemoglobin : 10.6 g/dL  Hematocrit : 29.2 %  Platelet Count - Automated : 248 K/uL  Mean Cell Volume : 92.7 fl  Mean Cell Hemoglobin : 33.8 pg  Mean Cell Hemoglobin Concentration : 36.4 gm/dL  Auto Neutrophil # : x  Auto Lymphocyte # : x  Auto Monocyte # : x  Auto Eosinophil # : x  Auto Basophil # : x  Auto Neutrophil % : x  Auto Lymphocyte % : x  Auto Monocyte % : x  Auto Eosinophil % : x  Auto Basophil % : x        PA NOTE:  POD#2 rpt cs at 39 1/7wk with BTL  -pain management  -oob   -diet, regular

## 2017-05-21 VITALS
RESPIRATION RATE: 18 BRPM | HEART RATE: 82 BPM | OXYGEN SATURATION: 99 % | TEMPERATURE: 98 F | DIASTOLIC BLOOD PRESSURE: 56 MMHG | SYSTOLIC BLOOD PRESSURE: 102 MMHG

## 2017-05-21 RX ADMIN — Medication 100 MILLIGRAM(S): at 06:28

## 2017-05-21 NOTE — PROGRESS NOTE ADULT - PROBLEM SELECTOR PLAN 1
Discharge instructions given, pt verbalizes understanding  Follow up in 1-2 weeks for incision check.

## 2017-05-21 NOTE — PROGRESS NOTE ADULT - SUBJECTIVE AND OBJECTIVE BOX
Patient seen resting comfortably.  No new complaints. Reports incisional pain, controlled on pain medications.  Denies CP, SOB, N/V/D, dizziness, palpitations, fever/chills. Voiding spontaneously, denies dysuria, urgency or frequency. +Flatus, - BM. Tolerating regular diet.     Vital Signs Last 24 Hrs  T(C): 36.6, Max: 36.6 (05-21 @ 00:00)  T(F): 97.9, Max: 97.9 (05-21 @ 00:00)  HR: 82 (82 - 82)  BP: 102/56 (102/56 - 102/56)  RR: 18 (18 - 18)  SpO2: 99% (99% - 99%)    Gen: A&O x 3, NAD  Chest: CTABL  Cardiac: S1+S2+ RRR  Breast: Soft, nontender, nonengorged  Abdomen: Nontender, nondistended, +BS, Incision c/d/i, no erythema, staples removed  Gyn: lochia wnl  Extremities: Nontender, no worsening edema                          10.6   15.3  )-----------( 248      ( 20 May 2017 07:07 )             29.2

## 2017-05-24 DIAGNOSIS — Z3A.39 39 WEEKS GESTATION OF PREGNANCY: ICD-10-CM

## 2017-05-24 DIAGNOSIS — O34.219 MATERNAL CARE FOR UNSPECIFIED TYPE SCAR FROM PREVIOUS CESAREAN DELIVERY: ICD-10-CM

## 2017-05-26 ENCOUNTER — OUTPATIENT (OUTPATIENT)
Dept: OUTPATIENT SERVICES | Facility: HOSPITAL | Age: 22
LOS: 1 days | End: 2017-05-26
Payer: MEDICAID

## 2017-05-26 ENCOUNTER — APPOINTMENT (OUTPATIENT)
Dept: OBGYN | Facility: CLINIC | Age: 22
End: 2017-05-26

## 2017-05-26 VITALS
OXYGEN SATURATION: 99 % | TEMPERATURE: 98.7 F | RESPIRATION RATE: 16 BRPM | BODY MASS INDEX: 27.79 KG/M2 | HEART RATE: 88 BPM | DIASTOLIC BLOOD PRESSURE: 64 MMHG | WEIGHT: 151 LBS | SYSTOLIC BLOOD PRESSURE: 99 MMHG | HEIGHT: 62 IN

## 2017-05-26 DIAGNOSIS — Z34.00 ENCOUNTER FOR SUPERVISION OF NORMAL FIRST PREGNANCY, UNSPECIFIED TRIMESTER: ICD-10-CM

## 2017-06-02 DIAGNOSIS — Z09 ENCOUNTER FOR FOLLOW-UP EXAMINATION AFTER COMPLETED TREATMENT FOR CONDITIONS OTHER THAN MALIGNANT NEOPLASM: ICD-10-CM

## 2017-06-30 ENCOUNTER — APPOINTMENT (OUTPATIENT)
Dept: OBGYN | Facility: CLINIC | Age: 22
End: 2017-06-30

## 2017-06-30 ENCOUNTER — OUTPATIENT (OUTPATIENT)
Dept: OUTPATIENT SERVICES | Facility: HOSPITAL | Age: 22
LOS: 1 days | End: 2017-06-30
Payer: MEDICAID

## 2017-06-30 VITALS
TEMPERATURE: 98.1 F | WEIGHT: 150 LBS | SYSTOLIC BLOOD PRESSURE: 96 MMHG | HEART RATE: 73 BPM | BODY MASS INDEX: 27.6 KG/M2 | HEIGHT: 62 IN | RESPIRATION RATE: 16 BRPM | DIASTOLIC BLOOD PRESSURE: 67 MMHG

## 2017-06-30 DIAGNOSIS — Z00.00 ENCOUNTER FOR GENERAL ADULT MEDICAL EXAMINATION WITHOUT ABNORMAL FINDINGS: ICD-10-CM

## 2017-07-23 PROCEDURE — 59050 FETAL MONITOR W/REPORT: CPT

## 2017-07-23 PROCEDURE — 85027 COMPLETE CBC AUTOMATED: CPT

## 2017-07-23 PROCEDURE — 88302 TISSUE EXAM BY PATHOLOGIST: CPT

## 2017-10-25 ENCOUNTER — APPOINTMENT (OUTPATIENT)
Dept: OBGYN | Facility: CLINIC | Age: 22
End: 2017-10-25

## 2018-01-16 ENCOUNTER — OUTPATIENT (OUTPATIENT)
Dept: OUTPATIENT SERVICES | Facility: HOSPITAL | Age: 23
LOS: 1 days | End: 2018-01-16
Payer: SELF-PAY

## 2018-01-16 ENCOUNTER — APPOINTMENT (OUTPATIENT)
Dept: OBGYN | Facility: CLINIC | Age: 23
End: 2018-01-16
Payer: COMMERCIAL

## 2018-01-16 VITALS
DIASTOLIC BLOOD PRESSURE: 64 MMHG | HEART RATE: 66 BPM | BODY MASS INDEX: 27.23 KG/M2 | TEMPERATURE: 97.6 F | HEIGHT: 62 IN | WEIGHT: 148 LBS | SYSTOLIC BLOOD PRESSURE: 103 MMHG

## 2018-01-16 DIAGNOSIS — Z00.00 ENCOUNTER FOR GENERAL ADULT MEDICAL EXAMINATION WITHOUT ABNORMAL FINDINGS: ICD-10-CM

## 2018-01-16 DIAGNOSIS — Z72.51 HIGH RISK HETEROSEXUAL BEHAVIOR: ICD-10-CM

## 2018-01-16 DIAGNOSIS — N89.8 OTHER SPECIFIED NONINFLAMMATORY DISORDERS OF VAGINA: ICD-10-CM

## 2018-01-16 PROCEDURE — 99213 OFFICE O/P EST LOW 20 MIN: CPT

## 2018-01-16 PROCEDURE — G0463: CPT

## 2018-01-17 DIAGNOSIS — Z72.51 HIGH RISK HETEROSEXUAL BEHAVIOR: ICD-10-CM

## 2018-01-17 DIAGNOSIS — N89.8 OTHER SPECIFIED NONINFLAMMATORY DISORDERS OF VAGINA: ICD-10-CM

## 2018-01-17 LAB
C TRACH RRNA SPEC QL NAA+PROBE: SIGNIFICANT CHANGE UP
CANDIDA AB TITR SER: SIGNIFICANT CHANGE UP
G VAGINALIS DNA SPEC QL NAA+PROBE: SIGNIFICANT CHANGE UP
N GONORRHOEA RRNA SPEC QL NAA+PROBE: SIGNIFICANT CHANGE UP
SPECIMEN SOURCE: SIGNIFICANT CHANGE UP
T PALLIDUM AB TITR SER: NEGATIVE — SIGNIFICANT CHANGE UP
T VAGINALIS SPEC QL WET PREP: SIGNIFICANT CHANGE UP

## 2018-01-18 LAB
BASOPHILS # BLD AUTO: 0.06 K/UL — SIGNIFICANT CHANGE UP (ref 0–0.2)
BASOPHILS NFR BLD AUTO: 0.8 % — SIGNIFICANT CHANGE UP (ref 0–2)
EOSINOPHIL # BLD AUTO: 0.86 K/UL — HIGH (ref 0–0.5)
EOSINOPHIL NFR BLD AUTO: 11.7 % — HIGH (ref 0–6)
HBV SURFACE AG SER-ACNC: SIGNIFICANT CHANGE UP
HCT VFR BLD CALC: 36.9 % — SIGNIFICANT CHANGE UP (ref 34.5–45)
HGB BLD-MCNC: 12.2 G/DL — SIGNIFICANT CHANGE UP (ref 11.5–15.5)
HIV 1+2 AB+HIV1 P24 AG SERPL QL IA: SIGNIFICANT CHANGE UP
HSV1 IGG SER-ACNC: 46.4 INDEX — HIGH
HSV1 IGG SERPL QL IA: POSITIVE
HSV2 IGG FLD-ACNC: 0.06 INDEX — SIGNIFICANT CHANGE UP
HSV2 IGG SERPL QL IA: NEGATIVE — SIGNIFICANT CHANGE UP
IMM GRANULOCYTES NFR BLD AUTO: 0.3 % — SIGNIFICANT CHANGE UP (ref 0–1.5)
LYMPHOCYTES # BLD AUTO: 2.67 K/UL — SIGNIFICANT CHANGE UP (ref 1–3.3)
LYMPHOCYTES # BLD AUTO: 36.4 % — SIGNIFICANT CHANGE UP (ref 13–44)
MCHC RBC-ENTMCNC: 27.9 PG — SIGNIFICANT CHANGE UP (ref 27–34)
MCHC RBC-ENTMCNC: 33.1 GM/DL — SIGNIFICANT CHANGE UP (ref 32–36)
MCV RBC AUTO: 84.4 FL — SIGNIFICANT CHANGE UP (ref 80–100)
MONOCYTES # BLD AUTO: 0.39 K/UL — SIGNIFICANT CHANGE UP (ref 0–0.9)
MONOCYTES NFR BLD AUTO: 5.3 % — SIGNIFICANT CHANGE UP (ref 2–14)
NEUTROPHILS # BLD AUTO: 3.34 K/UL — SIGNIFICANT CHANGE UP (ref 1.8–7.4)
NEUTROPHILS NFR BLD AUTO: 45.5 % — SIGNIFICANT CHANGE UP (ref 43–77)
PLATELET # BLD AUTO: 324 K/UL — SIGNIFICANT CHANGE UP (ref 150–400)
RBC # BLD: 4.37 M/UL — SIGNIFICANT CHANGE UP (ref 3.8–5.2)
RBC # FLD: 13.5 % — SIGNIFICANT CHANGE UP (ref 10.3–14.5)
WBC # BLD: 7.34 K/UL — SIGNIFICANT CHANGE UP (ref 3.8–10.5)
WBC # FLD AUTO: 7.34 K/UL — SIGNIFICANT CHANGE UP (ref 3.8–10.5)

## 2018-01-19 LAB
HSV1 AB FLD QL: SIGNIFICANT CHANGE UP TITER
HSV2 AB FLD-ACNC: SIGNIFICANT CHANGE UP TITER

## 2018-04-16 ENCOUNTER — EMERGENCY (EMERGENCY)
Facility: HOSPITAL | Age: 23
LOS: 1 days | Discharge: HOME | End: 2018-04-16
Attending: EMERGENCY MEDICINE
Payer: SELF-PAY

## 2018-04-16 VITALS
TEMPERATURE: 98 F | OXYGEN SATURATION: 100 % | HEIGHT: 62 IN | RESPIRATION RATE: 16 BRPM | WEIGHT: 145.06 LBS | DIASTOLIC BLOOD PRESSURE: 66 MMHG | HEART RATE: 82 BPM | SYSTOLIC BLOOD PRESSURE: 115 MMHG

## 2018-04-16 LAB
ALBUMIN SERPL ELPH-MCNC: 4.1 G/DL — SIGNIFICANT CHANGE UP (ref 3.5–5)
ALP SERPL-CCNC: 82 U/L — SIGNIFICANT CHANGE UP (ref 40–120)
ALT FLD-CCNC: 42 U/L DA — SIGNIFICANT CHANGE UP (ref 10–60)
ANION GAP SERPL CALC-SCNC: 6 MMOL/L — SIGNIFICANT CHANGE UP (ref 5–17)
APPEARANCE UR: CLEAR — SIGNIFICANT CHANGE UP
AST SERPL-CCNC: 22 U/L — SIGNIFICANT CHANGE UP (ref 10–40)
BASOPHILS # BLD AUTO: 0.1 K/UL — SIGNIFICANT CHANGE UP (ref 0–0.2)
BASOPHILS NFR BLD AUTO: 0.7 % — SIGNIFICANT CHANGE UP (ref 0–2)
BILIRUB SERPL-MCNC: 0.4 MG/DL — SIGNIFICANT CHANGE UP (ref 0.2–1.2)
BILIRUB UR-MCNC: NEGATIVE — SIGNIFICANT CHANGE UP
BUN SERPL-MCNC: 11 MG/DL — SIGNIFICANT CHANGE UP (ref 7–18)
CALCIUM SERPL-MCNC: 8.8 MG/DL — SIGNIFICANT CHANGE UP (ref 8.4–10.5)
CHLORIDE SERPL-SCNC: 104 MMOL/L — SIGNIFICANT CHANGE UP (ref 96–108)
CO2 SERPL-SCNC: 29 MMOL/L — SIGNIFICANT CHANGE UP (ref 22–31)
COLOR SPEC: YELLOW — SIGNIFICANT CHANGE UP
CREAT SERPL-MCNC: 0.79 MG/DL — SIGNIFICANT CHANGE UP (ref 0.5–1.3)
DIFF PNL FLD: ABNORMAL
EOSINOPHIL # BLD AUTO: 0.5 K/UL — SIGNIFICANT CHANGE UP (ref 0–0.5)
EOSINOPHIL NFR BLD AUTO: 3 % — SIGNIFICANT CHANGE UP (ref 0–6)
GLUCOSE SERPL-MCNC: 93 MG/DL — SIGNIFICANT CHANGE UP (ref 70–99)
GLUCOSE UR QL: NEGATIVE — SIGNIFICANT CHANGE UP
HCG SERPL-ACNC: <1 MIU/ML — SIGNIFICANT CHANGE UP
HCT VFR BLD CALC: 37 % — SIGNIFICANT CHANGE UP (ref 34.5–45)
HGB BLD-MCNC: 12.2 G/DL — SIGNIFICANT CHANGE UP (ref 11.5–15.5)
KETONES UR-MCNC: NEGATIVE — SIGNIFICANT CHANGE UP
LEUKOCYTE ESTERASE UR-ACNC: ABNORMAL
LIDOCAIN IGE QN: 167 U/L — SIGNIFICANT CHANGE UP (ref 73–393)
LYMPHOCYTES # BLD AUTO: 18.2 % — SIGNIFICANT CHANGE UP (ref 13–44)
LYMPHOCYTES # BLD AUTO: 2.9 K/UL — SIGNIFICANT CHANGE UP (ref 1–3.3)
MCHC RBC-ENTMCNC: 28.5 PG — SIGNIFICANT CHANGE UP (ref 27–34)
MCHC RBC-ENTMCNC: 33.1 GM/DL — SIGNIFICANT CHANGE UP (ref 32–36)
MCV RBC AUTO: 86.2 FL — SIGNIFICANT CHANGE UP (ref 80–100)
MONOCYTES # BLD AUTO: 0.6 K/UL — SIGNIFICANT CHANGE UP (ref 0–0.9)
MONOCYTES NFR BLD AUTO: 4.1 % — SIGNIFICANT CHANGE UP (ref 2–14)
NEUTROPHILS # BLD AUTO: 11.7 K/UL — HIGH (ref 1.8–7.4)
NEUTROPHILS NFR BLD AUTO: 74.1 % — SIGNIFICANT CHANGE UP (ref 43–77)
NITRITE UR-MCNC: POSITIVE
PH UR: 7 — SIGNIFICANT CHANGE UP (ref 5–8)
PLATELET # BLD AUTO: 324 K/UL — SIGNIFICANT CHANGE UP (ref 150–400)
POTASSIUM SERPL-MCNC: 3.5 MMOL/L — SIGNIFICANT CHANGE UP (ref 3.5–5.3)
POTASSIUM SERPL-SCNC: 3.5 MMOL/L — SIGNIFICANT CHANGE UP (ref 3.5–5.3)
PROT SERPL-MCNC: 7.6 G/DL — SIGNIFICANT CHANGE UP (ref 6–8.3)
PROT UR-MCNC: 30 MG/DL
RBC # BLD: 4.29 M/UL — SIGNIFICANT CHANGE UP (ref 3.8–5.2)
RBC # FLD: 13 % — SIGNIFICANT CHANGE UP (ref 10.3–14.5)
SODIUM SERPL-SCNC: 139 MMOL/L — SIGNIFICANT CHANGE UP (ref 135–145)
SP GR SPEC: 1.01 — SIGNIFICANT CHANGE UP (ref 1.01–1.02)
UROBILINOGEN FLD QL: NEGATIVE — SIGNIFICANT CHANGE UP
WBC # BLD: 15.8 K/UL — HIGH (ref 3.8–10.5)
WBC # FLD AUTO: 15.8 K/UL — HIGH (ref 3.8–10.5)

## 2018-04-16 PROCEDURE — 99285 EMERGENCY DEPT VISIT HI MDM: CPT | Mod: 25

## 2018-04-16 PROCEDURE — 99053 MED SERV 10PM-8AM 24 HR FAC: CPT

## 2018-04-16 RX ORDER — SODIUM CHLORIDE 9 MG/ML
1000 INJECTION INTRAMUSCULAR; INTRAVENOUS; SUBCUTANEOUS ONCE
Qty: 0 | Refills: 0 | Status: COMPLETED | OUTPATIENT
Start: 2018-04-16 | End: 2018-04-16

## 2018-04-16 RX ORDER — KETOROLAC TROMETHAMINE 30 MG/ML
30 SYRINGE (ML) INJECTION ONCE
Qty: 0 | Refills: 0 | Status: DISCONTINUED | OUTPATIENT
Start: 2018-04-16 | End: 2018-04-16

## 2018-04-16 RX ORDER — ONDANSETRON 8 MG/1
4 TABLET, FILM COATED ORAL ONCE
Qty: 0 | Refills: 0 | Status: COMPLETED | OUTPATIENT
Start: 2018-04-16 | End: 2018-04-16

## 2018-04-16 RX ORDER — SODIUM CHLORIDE 9 MG/ML
3 INJECTION INTRAMUSCULAR; INTRAVENOUS; SUBCUTANEOUS ONCE
Qty: 0 | Refills: 0 | Status: COMPLETED | OUTPATIENT
Start: 2018-04-16 | End: 2018-04-16

## 2018-04-16 RX ADMIN — SODIUM CHLORIDE 3 MILLILITER(S): 9 INJECTION INTRAMUSCULAR; INTRAVENOUS; SUBCUTANEOUS at 22:56

## 2018-04-16 RX ADMIN — SODIUM CHLORIDE 1000 MILLILITER(S): 9 INJECTION INTRAMUSCULAR; INTRAVENOUS; SUBCUTANEOUS at 23:16

## 2018-04-16 NOTE — ED PROVIDER NOTE - OBJECTIVE STATEMENT
21 y/o F pt with no significant PMHx and a significant PSHxof  (x11 months ago), s/p  presents to the ED c/o abd pain since 1500 today with whitish vaginal discharge x2 weeks. Pt also reports a subjective fever x today, burning urination and vaginal spotting. Pt denies vomiting, melena, bloody stools or any other complaints. NKDA. LMP: 3 weeks ago. 21 y/o F pt with no significant PMHx and a significant PSHxof  (x11 months ago), s/p full term birth, presents to the ED c/o lower abd pain since 1500 today with whitish vaginal discharge x2 weeks. Pt also reports a subjective fever x today with burning urination and diarrhea. Pt states she noticed some blood on her underwear when she went to the bathroom; pt unsure if vaginal or urinary. Pt denies vomiting, melena, bloody stools or any other complaints. NKDA. LMP: 3 weeks ago.

## 2018-04-16 NOTE — ED PROVIDER NOTE - CARE PLAN
Principal Discharge DX:	PID (pelvic inflammatory disease)  Secondary Diagnosis:	UTI (urinary tract infection)

## 2018-04-16 NOTE — ED PROVIDER NOTE - MEDICAL DECISION MAKING DETAILS
21 y/o F pt presents with 1 day of pelvic pain associated with vaginal discharge. Will order labs, UA, pelvic US and reassess.

## 2018-04-16 NOTE — ED PROVIDER NOTE - PROGRESS NOTE DETAILS
labs show wbc 15K, cmp unremarkable, UA cw UTI-given IV ceftriaxone  pelvic US shows left complex ovarian cyst, CT neg for appendicitis  discussed above with patient, in setting of positive PE exam findings for PID will treat with IM ceftriaxone, po doxy and flagyl  patient well-appearing, stable for discharge and given careful return precautions

## 2018-04-17 LAB
C TRACH RRNA SPEC QL NAA+PROBE: SIGNIFICANT CHANGE UP
N GONORRHOEA RRNA SPEC QL NAA+PROBE: SIGNIFICANT CHANGE UP
SPECIMEN SOURCE: SIGNIFICANT CHANGE UP

## 2018-04-17 PROCEDURE — 96375 TX/PRO/DX INJ NEW DRUG ADDON: CPT

## 2018-04-17 PROCEDURE — 80053 COMPREHEN METABOLIC PANEL: CPT

## 2018-04-17 PROCEDURE — 96374 THER/PROPH/DIAG INJ IV PUSH: CPT | Mod: XU

## 2018-04-17 PROCEDURE — 76830 TRANSVAGINAL US NON-OB: CPT | Mod: 26

## 2018-04-17 PROCEDURE — 76830 TRANSVAGINAL US NON-OB: CPT

## 2018-04-17 PROCEDURE — 87186 SC STD MICRODIL/AGAR DIL: CPT

## 2018-04-17 PROCEDURE — 76856 US EXAM PELVIC COMPLETE: CPT

## 2018-04-17 PROCEDURE — 84702 CHORIONIC GONADOTROPIN TEST: CPT

## 2018-04-17 PROCEDURE — 74177 CT ABD & PELVIS W/CONTRAST: CPT

## 2018-04-17 PROCEDURE — 87086 URINE CULTURE/COLONY COUNT: CPT

## 2018-04-17 PROCEDURE — 85027 COMPLETE CBC AUTOMATED: CPT

## 2018-04-17 PROCEDURE — 99284 EMERGENCY DEPT VISIT MOD MDM: CPT | Mod: 25

## 2018-04-17 PROCEDURE — 74177 CT ABD & PELVIS W/CONTRAST: CPT | Mod: 26

## 2018-04-17 PROCEDURE — 83690 ASSAY OF LIPASE: CPT

## 2018-04-17 PROCEDURE — 81001 URINALYSIS AUTO W/SCOPE: CPT

## 2018-04-17 PROCEDURE — 76856 US EXAM PELVIC COMPLETE: CPT | Mod: 26

## 2018-04-17 RX ORDER — CEFTRIAXONE 500 MG/1
250 INJECTION, POWDER, FOR SOLUTION INTRAMUSCULAR; INTRAVENOUS ONCE
Qty: 0 | Refills: 0 | Status: DISCONTINUED | OUTPATIENT
Start: 2018-04-17 | End: 2018-04-20

## 2018-04-17 RX ORDER — CEFTRIAXONE 500 MG/1
1 INJECTION, POWDER, FOR SOLUTION INTRAMUSCULAR; INTRAVENOUS ONCE
Qty: 0 | Refills: 0 | Status: COMPLETED | OUTPATIENT
Start: 2018-04-17 | End: 2018-04-17

## 2018-04-17 RX ORDER — METRONIDAZOLE 500 MG
500 TABLET ORAL ONCE
Qty: 0 | Refills: 0 | Status: DISCONTINUED | OUTPATIENT
Start: 2018-04-17 | End: 2018-04-20

## 2018-04-17 RX ORDER — METRONIDAZOLE 500 MG
1 TABLET ORAL
Qty: 28 | Refills: 0 | OUTPATIENT
Start: 2018-04-17 | End: 2018-04-30

## 2018-04-17 RX ORDER — CEPHALEXIN 500 MG
1 CAPSULE ORAL
Qty: 14 | Refills: 0 | OUTPATIENT
Start: 2018-04-17 | End: 2018-04-23

## 2018-04-17 RX ADMIN — CEFTRIAXONE 100 GRAM(S): 500 INJECTION, POWDER, FOR SOLUTION INTRAMUSCULAR; INTRAVENOUS at 02:29

## 2018-04-17 RX ADMIN — Medication 30 MILLIGRAM(S): at 00:10

## 2018-04-17 RX ADMIN — Medication 30 MILLIGRAM(S): at 00:47

## 2023-10-10 NOTE — ED PROVIDER NOTE - SOCIAL CONCERNS
NOTE:  This patient received Medical Nutrition Management; initial assessment and intervention , in an individual, face-to-face encounter with an RD/LD at a prior appointment at the [facility]. Weight:  206 lbs  Height:  5' 4\"     Pt in for follow up with getting back on track with diet plan. Pt is now eating meals that are smaller in size and she is eating more often. (Pt had been eating just once daily). She is now compliant with most elements of her plan. One area that is difficult for pt is not drinking with her food intake. Reminded pt to follow post op bariatric diet fundaments, as follows: Follow a low-fat, no-added-sugar diet, eating 6 small meals daily, each meal 1/2 cup or less in total volume, meet daily protein goal of 69-79 grams (use protein supplement if needed to meet goal), drink 64 oz (or more) of water daily, and do not drink liquids with meals but wait 30 minutes after meal to drink liquids. Pt is also to take all approved bariatric vitamin & mineral supplements. Pt has a copy of the Patient Guide to bariatric surgery. Advised pt to follow the diet information closely. Pt reported that now that she is eating smaller meals and more often, she is satisfied doing so and plans to continue this pattern. Pt is meting water goal.  Pt takes all vit/mineral supplements (Bariatric [employer] brand). Pt is using a protein supplement - Premier Protein. Though this not approved, pt plans to continue it. Suggested Fairlife protein supplement an approved ready-to-drink supplement. Congratulated pt on her improved compliance with her diet plan and on losing 5 lbs in past 6 weeks.        Plan / goals  Follow a low-fat, no-added-sugar diet, eating 6 small meals daily, each meal 1/2 cup or less in total volume, meet daily protein goal of 69-79 grams (use protein supplement if needed to meet goal), drink 64 oz (or more) of water daily, and do not drink liquids None

## 2024-09-26 NOTE — PATIENT PROFILE OB - RESUSCITATION EFFORTS, BABY A
We have reached out to Leslie Jude to inform him of the [employer] application process for [employer] and whats required to apply.  He did not answer.         I mailed a letter    Follow-up will be made in 5 business days.    Hilario Sen   Pharmacy Patient Assistance Team   No

## 2024-12-31 NOTE — PATIENT PROFILE OB - HOW PATIENT ADDRESSED, OB PROFILE
CHIEF COMPLAINT:  Pain of the Lower Back     HISTORY OF PRESENT ILLNESS  Ms. Gordon is a pleasant 37 year old year old female who presents to clinic today with low back pain.  Leslie explains that she was bending over and twisting to lift her child's toy kitchen 1.5 weeks ago. She states her low back locked up and she was unable to get up from the floor for about an hour. She reports pain in left>right low lumbar region.  Denies radiculopathy currently but did have radiating pain to thigh at onset. This has improved. No lower extremity weakness.    Seen virtually by Dr. Luis Carlos Parnell who recommended prednisone course and robaxin.  She was diligent with prednisone at 20mg dose due to side effects. Stopped briefly over mena and resumed two days ago. She states she is a  and is not able to work at this time due to pain and lack of function.    Onset: sudden  Location: bilateral low back  Quality:  sharp, achy, dull  Duration: 12/22/24  Severity: 8/10 at worst  Timing:constant dull pain, sharp when moving  Modifying factors:  resting and non-use makes it better, standing and child pose makes the pain better, movement and use makes it worse  Associated signs & symptoms: pain, tingling down right buttock the first few days after the injury.  Previous similar pain: No  Treatments to date:Chiropractor 12/23/24 which made function significantly worse, Ibuprofen, Prednisone, Methocarbamol which are all helping.    Additional history: as documented    Review of Systems:  Have you recently had a a fever, chills, weight loss? No  Do you have any vision problems? No  Do you have any chest pain or edema? No  Do you have any shortness of breath or wheezing?  No  Do you have stomach problems? No  Do you have any numbness or focal weakness? No  Do you have diabetes? No  Do you have problems with bleeding or clotting? No  Do you have an rashes or other skin lesions? No    MEDICAL HISTORY  Patient Active Problem List    Diagnosis    Attention deficit hyperactivity disorder (ADHD), unspecified ADHD type    TOYA (generalized anxiety disorder)    Insomnia, unspecified type       Current Outpatient Medications   Medication Sig Dispense Refill    methocarbamol (ROBAXIN) 500 MG tablet Take 1 tablet (500 mg) by mouth 4 times daily. 24 tablet 1    nystatin (MYCOSTATIN) 251063 UNIT/GM external ointment Apply topically 2 times daily (Patient not taking: Reported on 5/22/2023) 30 g 1    predniSONE (DELTASONE) 20 MG tablet Take 2 tablets (40 mg) by mouth daily. 10 tablet 0    valACYclovir (VALTREX) 500 MG tablet Take 1 tablet (500 mg) by mouth 2 times daily for 3 days (Patient not taking: Reported on 5/22/2023) 6 tablet 2    zolpidem (AMBIEN) 10 MG tablet Take 1 tablet (10 mg) by mouth nightly as needed for sleep Takes 1/2 pill (Patient not taking: Reported on 5/22/2023) 30 tablet 0       Allergies   Allergen Reactions    No Known Allergies        Family History   Problem Relation Age of Onset    Parkinsonism Mother     Chronic Obstructive Pulmonary Disease Father     Hypertension Father        Additional medical/Social/Surgical histories reviewed in [facility] and updated as appropriate.       PHYSICAL EXAM  There were no vitals taken for this visit.    General  - normal appearance, in no obvious distress  Musculoskeletal - lumbar spine  - stance: slow to rise and sit  - inspection: normal bone and joint alignment, no obvious scoliosis  - palpation: Lumbar paravertebral spasms bilateral L3-L5 region.  Additional tenderness at right SI joint.  - ROM: Pain with bilateral rotation, flexion past 30 deg, extension  - strength: lower extremities 5/5 in all planes  - special tests:  (-) straight leg raise bilaterally  (-) slump test  Neuro  - patellar and Achilles DTRs 2+ bilaterally, no sensory or motor deficit, grossly normal coordination, normal muscle tone  Skin  - no ecchymosis, erythema, warmth, or induration, no obvious rash  Psych  - interactive,  appropriate, normal mood and affect      IMAGING : XR lumbar 2 views and XR Pelvis 2 views. Final results and radiologist's interpretation, available in the [facility] health record. Images were reviewed with the patient/family members in the office today. My personal interpretation of the performed imaging is      ASSESSMENT & PLAN  Ms. Gordon is a 37 year old year old female who presents to clinic today with acute low back pain without features of radiculitis.    Concern at this time for acute lumbar spasm secondary to possible discogenic pathology.  Radicular symptoms into thigh have improved with prednisone course and no red flags.    Diagnosis:   Acute lumbago with radiculitis  Lumbar paraspinal spasm    -Start physical therapy course and acute spine pathway initiated for urgent appointment  -Gentle HEP provided today to begin prior to PT which I presume will begin next week  -Start celebrex course given stomach discomfort related to prednisone and ibuprofen use. Discontinue ibuprofen.  -Continue robaxin prior to bedtime  -Recommend taking off of work Friday and resting, no work note requested  -Follow up with Dr. Parnell or myself if no improvement in the next 7-10 days or in 4 weeks if improving.  Consideration for MRI if no significant improvement with initial PT intervention and medication changes.    It was a pleasure seeing Leslie today.    Luis Carlos Hilton DO, RACHEL    Primary Care Sports Medicine  Department of Orthopedic Surgery  [facility]   [place]